# Patient Record
Sex: MALE | Race: WHITE | Employment: FULL TIME | URBAN - METROPOLITAN AREA
[De-identification: names, ages, dates, MRNs, and addresses within clinical notes are randomized per-mention and may not be internally consistent; named-entity substitution may affect disease eponyms.]

---

## 2019-02-06 ENCOUNTER — OFFICE VISIT (OUTPATIENT)
Dept: PULMONOLOGY | Age: 59
End: 2019-02-06

## 2019-02-06 VITALS
HEART RATE: 75 BPM | SYSTOLIC BLOOD PRESSURE: 108 MMHG | BODY MASS INDEX: 27.35 KG/M2 | OXYGEN SATURATION: 94 % | WEIGHT: 201.9 LBS | HEIGHT: 72 IN | TEMPERATURE: 98.2 F | DIASTOLIC BLOOD PRESSURE: 62 MMHG | RESPIRATION RATE: 18 BRPM

## 2019-02-06 DIAGNOSIS — J18.9 MULTIFOCAL PNEUMONIA: Primary | ICD-10-CM

## 2019-02-06 DIAGNOSIS — R53.83 MALAISE AND FATIGUE: ICD-10-CM

## 2019-02-06 DIAGNOSIS — R07.89 ATYPICAL CHEST PAIN: ICD-10-CM

## 2019-02-06 DIAGNOSIS — R05.3 CHRONIC COUGH: ICD-10-CM

## 2019-02-06 DIAGNOSIS — K22.5 ZENKER DIVERTICULA: ICD-10-CM

## 2019-02-06 DIAGNOSIS — J47.0 BRONCHIECTASIS WITH ACUTE LOWER RESPIRATORY INFECTION (HCC): ICD-10-CM

## 2019-02-06 DIAGNOSIS — J69.0 ASPIRATION PNEUMONIA OF BOTH LOWER LOBES DUE TO GASTRIC SECRETIONS (HCC): ICD-10-CM

## 2019-02-06 DIAGNOSIS — R53.81 MALAISE AND FATIGUE: ICD-10-CM

## 2019-02-06 RX ORDER — TESTOSTERONE 20.25 MG/1.25G
81 GEL TOPICAL DAILY
COMMUNITY
Start: 2018-12-05

## 2019-02-06 RX ORDER — DIVALPROEX SODIUM 500 MG/1
500 TABLET, DELAYED RELEASE ORAL 2 TIMES DAILY
COMMUNITY
Start: 2018-11-01 | End: 2022-10-21

## 2019-02-06 RX ORDER — SODIUM CHLORIDE FOR INHALATION 3 %
4 VIAL, NEBULIZER (ML) INHALATION 4 TIMES DAILY
Qty: 1440 ML | Refills: 3 | Status: SHIPPED | OUTPATIENT
Start: 2019-02-06

## 2019-02-06 RX ORDER — SIMVASTATIN 20 MG/1
20 TABLET, FILM COATED ORAL
COMMUNITY

## 2019-02-06 RX ORDER — GUAIFENESIN 600 MG/1
600 TABLET, EXTENDED RELEASE ORAL 2 TIMES DAILY
Qty: 60 TAB | Refills: 6 | Status: SHIPPED | OUTPATIENT
Start: 2019-02-06

## 2019-02-06 RX ORDER — BUPROPION HYDROCHLORIDE 100 MG/1
100 TABLET ORAL DAILY
COMMUNITY

## 2019-02-06 RX ORDER — DIPHENOXYLATE HYDROCHLORIDE AND ATROPINE SULFATE 2.5; .025 MG/1; MG/1
1 TABLET ORAL
COMMUNITY

## 2019-02-06 RX ORDER — ALBUTEROL SULFATE 0.83 MG/ML
2.5 SOLUTION RESPIRATORY (INHALATION) 4 TIMES DAILY
Qty: 360 EACH | Refills: 3 | Status: SHIPPED | OUTPATIENT
Start: 2019-02-06

## 2019-02-06 NOTE — PROGRESS NOTES
[address] [place] [address], [address] Kellen Santos [address] 
[phone] [facility] Pulmonary, Critical Care, and Sleep Medicine Pulmonary Office Initial Consultation Name: Kristin Alejandre 60 y.o. male MRN: [id number] : 1960 Service Date: 19 Referring Provider: Carla Eric MD 
[address] [facility] [address] [place], [address] Chief Complaint:  
Chief Complaint Patient presents with  Abnormal CT Scan  
  referred by Dr. Tyler Dangelo; CT 12/10/2018  Chest Pain History of Present Illness: 
Kristin Alejandre is a 60 y.o. male, who presents to Pulmonary clinic referred for abnormal CT imaging by his PCP. Pt reported he has been having chest pain \"down inside\". This has been going on for at least a year, may be last 1-2 years. Pt reported he was told he had \"calcification\" in his lungs in the past.  He describes the chest pain as discomfort. Associated with malaise and fatigue. Symptoms for the last year of this severity. Pt reports no chest pain with exertion/angina. Pt reports chest pain daily - constant, substernal, sometimes radiating to the back. Patient reports no alleviating factors or other modifying factors. He reports no changes with food consumption. Pt reported symptoms were worsening recently, so he saw his PCP in December and he was sent for CT scan. Pt reports that he underwent a course of ABx (Avelox) after the CT scan. Pt denied any fevers or chills or night sweats. Of note, pt reports he had a CXR a few years ago. He reports it was abnormal - showing he had a \"calcification\". Patient reports he was referred to GI. He saw Dr. Deep Rey with GI, in [place], who sent him for a barium swallow which diagnosed a Zenker's diverticulum. Patient was then referred to ENT by GI. Pt saw Dr. David Gomes with ENT, who noted that the patient was likely aspirating. During that visit, the patient had a laryngoscopy that confirmed the aspiration Of note, he originially saw Dr. Epi Rea for ulcerative colitis - he underwent a total procolectomy with Valenzuela pouch in 2/14/12. He was referred to Dr. Prachi Negron with [facility] - (Thoracic surgery?), with whom he is to see later this month. Patient reports associated coughing, intermittently productive, occurs on a daily basis both day and night. Pt has multiple nocturnal awakenings with coughing, which occur nightly. There are no modifying factors. No alleviating factors. He denies fevers, chills, night sweats, hemoptysis, angina, orthopnea, LE swelling, worsening voice hoarseness. 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 No occupational exposure to coal, silica, or asbestos Past Medical Hx: I have personally reviewed medical hx Past Medical History:  
Diagnosis Date  Depression  Hyperlipidemia  Hypogonadism in male  Ulcerative colitis ([address] Past Surgical Hx: I have personally reviewed surgical hx Past Surgical History:  
Procedure Laterality Date  HX COLECTOMY  02/14/2012 Family Hx: I have personally reviewed the family hx. No family hx of cancer or hereditary lung disease with immediate family. Family History Problem Relation Age of Onset  Heart Disease Mother  COPD Father  Heart Disease Father Social Hx: I have personally reviewed the social hx. Social History Socioeconomic History  Marital status:  Spouse name: Not on file  Number of children: Not on file  Years of education: Not on file  Highest education level: Not on file Social Needs  Financial resource strain: Not on file  Food insecurity - worry: Not on file  Food insecurity - inability: Not on file  Transportation needs - medical: Not on file  Transportation needs - non-medical: Not on file Occupational History  Not on file Tobacco Use  Smoking status: Not on file Substance and Sexual Activity  Alcohol use: Not on file  Drug use: Not on file  Sexual activity: Not on file Other Topics Concern  Not on file Social History Narrative  Not on file Allergies: I have reviewed the allergy hx No Known Allergies Medications:  I have reviewed the patient's medications Prior to Admission medications Medication Sig Start Date End Date Taking? Authorizing Provider  
simvastatin (ZOCOR) 20 mg tablet Take 20 mg by mouth nightly. Yes Provider, Historical  
diphenoxylate-atropine (LOMOTIL) 2.5-0.025 mg per tablet Take  by mouth four (4) times daily as needed for Diarrhea. Yes Provider, Historical  
buPROPion (WELLBUTRIN) 100 mg tablet Take 100 mg by mouth two (2) times a day. Yes Provider, Historical  
divalproex DR (DEPAKOTE) 500 mg tablet Take 500 mg by mouth two (2) times a day. 11/1/18  Yes Provider, Historical  
testosterone (ANDROGEL) 20.25 mg/1.25 gram (1.62 %) gel APPLY 3 PUMPS DAILY TO SHOULDERS 12/5/18  Yes Provider, Historical  
guaiFENesin ER (MUCINEX) 600 mg ER tablet Take 1 Tab by mouth two (2) times a day. 2/6/19  Yes Giulia Pickard MD  
sodium chloride 3 % nebulizer solution 4 mL by Nebulization route four (4) times daily. Use with albuterol nebulizer 2/6/19  Yes Giulia Pickard MD  
albuterol (PROVENTIL VENTOLIN) 2.5 mg /3 mL (0.083 %) nebulizer solution 3 mL by Nebulization route four (4) times daily. Please use with hypertonic saline 3% nebulizer 2/6/19  Yes Giulia Pickard MD  
 
 
Immunizations:  I have reviewed the patient's immunizations There is no immunization history on file for this patient. Review of Systems: A complete review of systems was performed as stated in the HPI, all others are negative.  
 
 
Objective: 
 
Physical Exam: 
/62 (BP 1 Location: Left arm, BP Patient Position: Sitting)   Pulse 75   Temp 98.2 °F (36.8 °C) (Oral)   Resp 18   Ht 6' (1.829 m)   [employer] 91.6 kg (201 lb 14.4 oz)   SpO2 94%   BMI 27.38 kg/m² Vitals were personally reviewed Gen: no acute distress, pleasant and cooperative, sitting up in chair, able to climb to exam table w/o difficulty HEENT: normocephalic/atraumatic, PERRLA, EOMI, no scleral icterus, nasal turbinates have no erythema, no nasal polyps, no oral lesions, good dentition, Mallampati I Neck: supple, trachea midline, no JVD, no cervical and supraclavicular adenopathy Chest: no lesions, with even rise and fall, no pectus excavatum or flail chest 
CVS: regular rate rhythm, S1/S2, no murmurs/rubs/gallops Lungs: good air entry B/L, CTABL, no wheezes/rales/rhonchi Back: no kyphosis or scoliosis Abdomen: soft, nontender, bowel sounds present, no hepatosplenomegaly Ext: no pitting edema B/L, no peripheral cyanosis or clubbing Neuro: grossly normal, AAOx3, normal strength and coordination grossly, no focal deficits Skin: no rashes, erythema, lesions Psych: normal memory, thought content, and processing Labs: I have reviewed the patient's available labs --reviewed from outside reports, obtained from [address] [place] [address] (19/55/4344 9:14 AM) COMPREHENSIVE METABOLIC PANEL (50/71/9605 9:14 AM) Component Value Ref Range Potassium 4.9 3.5 - 5.5 mmol/L  
SODIUM 145 133 - 145 mmol/L  
CHLORIDE 101 98 - 110 mmol/L Glucose 99 70 - 99 mg/dL CALCIUM 9.5 8.4 - 10.4 mg/dL ALBUMIN 4.3 3.5 - 5.0 g/dL SGPT (ALT) 17 5 - 40 U/L  
SGOT (AST) 16 10 - 37 U/L  
BILIRUBIN TOTAL 0.5 0.2 - 1.2 mg/dL ALKALINE PHOSPHATASE 75 25 - 115 U/L  
BUN 14 6 - 22 mg/dL CO2 26 20 - 32 mmol/L  
CREATININE 0.8 0.5 - 1.2 mg/dL  
eGFR African American >60.0 >60.0  
eGFR Non African American >60.0 >60.0 GLOBULIN SERUM 2.6 2.0 - 4.0 g/dL A/G RATIO 1.7 1.1 - 2.6 ratio TOTAL PROTEIN 6.9 6.4 - 8.3 g/dL ANION GAP 18.0 mmol/L  
 
COMPREHENSIVE METABOLIC PANEL (43/02/8266 9:14 AM) Specimen Performing Laboratory Blood [facility] REFERENCE [facility]   
[address] [facility], [address] [address]    
 
COMPREHENSIVE METABOLIC PANEL (04/68/4552 9:14 AM) Narrative Estimated GFR results are reported in mL/min/1.73 sq.m. by the MDRD equation.   
   
This eGFR is validated for stable chronic renal failure patients. This equation is unreliable in acute illness or patients with normal renal function.   
   
    
Back to top of Lab Results LIPID COMPLETE PANEL (06/11/2018 9:14 AM) LIPID COMPLETE PANEL (06/11/2018 9:14 AM) Component Value Ref Range Cholesterol 152 110 - 200 mg/dL Triglyceride 148 40 - 149 mg/dL HDL 52 40 - 59 mg/dL Cholesterol/HDL 2.9 0.0 - 5.0 LDL CALCULATION 71 50 - 99 mg/dL VLDL CALCULATION 30 8 - 30 mg/dL LIPID COMPLETE PANEL (06/11/2018 9:14 AM) Specimen Performing Laboratory Blood [facility] REFERENCE LAB   
[address], [address], [address]   
 
Back to top of Lab Results CBC (06/11/2018 9:14 AM) CBC (06/11/2018 9:14 AM) Component Value Ref Range WBC x 10*3 7.5 4.0 - 11.0 K/uL  
RBC x 10^6 4.52 3.80 - 5.80 M/uL  
HGB 14.8 13.1 - 17.2 g/dL HCT 44.6 39.3 - 51.6 % MCV 99 (H) 80 - 95 fL  
MCH 33 26 - 34 pg MCHC 33 31 - 36 g/dL  
RDW 12.6 10.0 - 15.5 % Platelet 134 798 - 867 K/uL MPV 10.3 9.0 - 13.0 fL  
 
CBC (06/11/2018 9:14 AM) Specimen Performing Laboratory Blood [facility] REFERENCE [facility]   
[address] [facility], [address], [address]   
 
 
Outside records reviewed personally as follows (scanned into [facility]): 
--Note from his primary care-Dr. Rachelle Ro from 12/14/18 shows the patient was there for follow-up due to chest pain. Patient had a CT scan done which showed multiple infiltrates. Per the report patient was prescribed Avelox to cover for lung infection, but patient continued to have persistent mid chest discomfort.   Patient did report some difficulty swallowing at times and noted some solid food got stuck in his upper chest, with some regurgitation of food. Patient was referred to pulmonary due to this abnormal CT scan. --Barium swallow from 1/17/19 report reviewed: 
Result Impression Zenker diverticulum, as described above. Suggestive for a small epiphrenic diverticulum Small hiatal hernia with mild gastroesophageal reflux. Correlate clinically. Consider correlation with endoscopy Signed By: Jeanie Arguelles MD on 1/17/2019 1:15 PM  
Result Narrative Indication: Dysphagia Comparison: None Total fluoroscopy time was 3 minutes. Total fluoroscopic images was 87 images. A preliminary chest x-ray demonstrates that heart size is within normal limits.  No acute infiltrates or pleural effusions are identified.  The mediastinal contours appear normal. 
 
Multiple fluoroscopic spot images were obtained during the oral ingestion of thin and thick barium suspensions following the ingestion of effervescent granules. Findings: The study demonstrates a large(approximately 3 cm in size) partially contrast filled outpouching arising from the midline the posterior wall of the distal pharynx near is a Pharyngeal esophageal junction, best identified during the swallowing phase and on the lateral projection. Most consistent with a Zenker's diverticulum. In addition, there is a smaller persistent contrast filled outpouching at the distal esophagus suggestive for epiphrenic diverticulum There is a small hiatal hernia with mildly gastroesophageal reflux identified during the examination. There is a secondary contraction of the esophageal peristalsis noted Imaging:  I have personally reviewed patient's imaging brought by disc by patient as follows and annotated below - CT chest with contrast from 12/10/18 shows scattered bilateral infiltrates in the right middle lobe, right lower lobe and left lower lobe.   Bilateral lower lobe bronchiectasis also seen. Of note esophagus is also dilated in 2 places, one near the epiglottis and the other in the mid esophageal region. No masses or adenopathy or effusions seen. Official report per radiology: 
Result Impression 1. Multifocal bilateral alveolar opacities, raising concern for multifocal pneumonia. 2. Partial visualized upper abdominal structures with dilated small bowel loops measuring up to 3.7 cm which raises concern for small bowel obstruction in this patient with a history of prior colectomy and ulcerative colitis, less likely ileus. 
 
-Above findings were discussed with Dr. Myra Bright at 87 [phone] 98 on 12/11/18, with confirmatory results. Signed By: Annamae Schilder, MD on 12/11/2018 1:31 PM  
Result Narrative EXAM: CT CHEST W/ CONTRAST CLINICAL INDICATION/HISTORY: R07.89: Chest wall pain R93.89: Abnormal chest CT. 
  > Additional: None COMPARISON: CT chest from 03/25/2017 TECHNIQUE: Helical CT imaging from the thoracic inlet through the diaphragm with intravenous contrast. Multiplanar reformats were generated. One or more dose reduction techniques were used on this CT: automated exposure control, adjustment of the mAs and/or kVp according to patient size, and iterative reconstruction techniques. The specific techniques used on this CT exam have been documented in the patient's electronic medical record. 
 
_______________ FINDINGS: 
 
LUNGS: Patchy peribronchial groundglass opacity in the left lower lobe, paramedial basilar, anterior lateral and basal segments. Medial segment right middle lobe patchy parenchymal opacity with patchy confluent areas of alveolar groundglass extending to the hilum. Additional areas of smaller confluent multifocal groundglass opacities in the infrahilar right lower lobe and both upper lobes without discrete consolidation. Redemonstration of calcified granuloma right upper lobe. PLEURA: No effusion or pneumothorax. AIRWAY: No filling defect, mild bilateral perihilar and left lower lobe bronchial wall thickening MEDIASTINUM: Normal heart size. No pericardial effusion. Great vessels unremarkable. LYMPH NODES: Subcentimeter scattered mediastinal lymph nodes with 1 cm enlarged right hilar lymph node. UPPER ABDOMEN: Cluster of dilated small bowel loops in the upper abdomen measuring up to 3.7 cm with fecalized material. Multiple low attenuating hepatic cysts, the largest on the left measuring 1.9 cm. Left upper pole 1.2 cm cyst is stable. Annamary Ripa OSSEOUS: No acute or aggressive abnormalities identified.    
 
OTHER: None. PFTs:  I have reviewed the patient's PFTs-none on file TTE:  I have reviewed the patient's TTE results No results found for this or any previous visit. Assessment and Plan: 
60 y.o. male with: 
 
Impression: 1. Multiple pulmonary infiltrates likely consistent with multifocal pneumonia secondary to suspected aspiration pneumonia: Seen on CT scan from 12/10/18, patient underwent a course of Avelox. Patient currently denies any infectious symptoms. 2.  Recurrent aspiration in the setting of his Zenker diverticulum: Patient reports symptoms for the last 2 years, abnormal esophagus seen on CT chest from 12/10/18. Patient referred to GI who ordered a barium swallow confirming diagnosis of Zenker's diverticulum. Patient then referred to ENT, confirming aspiration on laryngoscopy and patient referred to thoracic surgery. 3.  Bronchiectasis, non-CF: Seen in bilateral lower lobes, etiology most likely due to recurrent aspiration. Less likely this is due to his underlying IBD. 4.  Chronic cough: Etiology due to above 5. Malaise and fatigue 6. Atypical chest pain: Etiology due to above Plan: 
-Spent time going over CT scan with both patient and his wife. I reviewed the potential etiologies of his infiltrates as well as went over the etiologies of his bronchiectasis. -Repeat CT chest without contrast in 3 months 
-Agree with referral to thoracic surgery for correction of Zenker's diverticulum. I did advise the patient that we will continue to follow symptoms as he may also have a motility issue based off his esophageal dilation seen on CT scan near the mid esophagus. -Advised aggressive airway clearance 4 times daily due to bronchiectasis with the following: 
 Flutter device Adeel Duane), sample given in clinic 3% hypertonic saline nebs with albuterol, AeroEclipse nebulizer sample given in clinic Mucinex 600 mg twice daily 
-DME order placed for nebulizer with supplies given ancillary staff 
-Full PFTs at next visit 
-Counseled the patient regarding aspiration precautions including elevating head of bed greater than 30 degrees, avoiding eating or drinking 3 hours before laying recumbent, etc. 
-Advised patient that if he develops symptoms of respiratory infection including fevers or increased purulent sputum production, to contact our office as he may require broad-spectrum antibiotics to cover a polymicrobial infection including anaerobes. Patient and wife expressed understanding RTC: Follow-up Disposition: 
Return in about 10 weeks (around 4/17/2019). Orders Placed This Encounter  AMB POC PFT COMPLETE W/BRONCHODILATOR  AMB POC PFT COMPLETE W/O BRONCHODILATOR  
 GAS DILUT/WASHOUT LUNG VOL W/WO DISTRIB VENT&VOL  DIFFUSING CAPACITY  AMB SUPPLY ORDER  
 CT CHEST WO CONT  guaiFENesin ER (MUCINEX) 600 mg ER tablet  sodium chloride 3 % nebulizer solution  albuterol (PROVENTIL VENTOLIN) 2.5 mg /3 mL (0.083 %) nebulizer solution Fam Roberts MD/MPH Pulmonary, Critical Care Medicine [facility]

## 2019-02-06 NOTE — PROGRESS NOTES
Chief Complaint Patient presents with  Abnormal CT Scan  
  referred by Dr. Norberta Oppenheim; CT 12/10/2018 1. Have you been to the ER, urgent care clinic since your last visit? Hospitalized since your last visit? No 
 
2. Have you seen or consulted any other health care providers outside of the [address] [place] [address] since your last visit? Include any pap smears or colon screening. Yes Where: Dr. Kaleigh Abdalla, PCP, Dr. Eugenia Yi, ENT, Dr. Madiha Calderon, [facility] surgeon

## 2019-02-06 NOTE — LETTER
2019 6:06 PM 
 
Patient:  Jolene Suarez YOB: 1960 Date of Visit: 2019 Dear Wilford Apgar, MD [place] [address] [address] [address] [place] [address] VIA Facsimile: [phone] Jacki Rosales MD 
[facility] [address] VIA Facsimile: [phone] Radha Sanford MD 
[address]  
[address] [place] [address] VIA Facsimile: [phone] Melanie Jackson, DO 
[address] [facility] [address] [place] [address] VIA Facsimile: [phone] 
 : 
 
Thank you for referring Mr. Devin Leone to me for evaluation/treatment. Below are the relevant portions of my assessment and plan of care. If you have questions, please do not hesitate to call me. I look forward to following Mr. Tasha Hernandez along with you. Sincerely, Xu Longo MD/MPH Pulmonary, Critical Care Medicine [facility]

## 2019-02-11 ENCOUNTER — TELEPHONE (OUTPATIENT)
Dept: PULMONOLOGY | Age: 59
End: 2019-02-11

## 2019-02-11 NOTE — TELEPHONE ENCOUNTER
Pt wife stated she has received all Dr. Drea Duke prescribed for pt except for the Nebulizer. Wanting to know when he should be expecting to get it because she thought it all came together. Please advise [phone].

## 2019-02-11 NOTE — TELEPHONE ENCOUNTER
Med was received from pharmacy but pt and wife didn't know where the Neb machine would be coming from. [facility] was where the order was faxed this am. They will be getting a call from [place] [employer] [place].  Wife states she understands

## 2019-02-15 ENCOUNTER — TELEPHONE (OUTPATIENT)
Dept: PULMONOLOGY | Age: 59
End: 2019-02-15

## 2019-02-15 NOTE — TELEPHONE ENCOUNTER
PT'S WIFE [phone]). DR MCALLISTER WANTED PATIENT TO HAVE A NEBULIZER AND HE HAS NOT HEARD FROM ANYONE. HE WANTS TO KNOW IF HE CAN JUST BUY ONE. PLEASE CHECK AND CALL BACK.

## 2019-02-15 NOTE — TELEPHONE ENCOUNTER
Spoke with Willian Hernandez with [facility]. They have now gotten prior approval for the nebulizer for the pt. They will be contacting re delivery.  Wife informed

## 2019-04-12 ENCOUNTER — HOSPITAL ENCOUNTER (OUTPATIENT)
Dept: CT IMAGING | Age: 59
Discharge: HOME | End: 2019-04-12
Attending: INTERNAL MEDICINE
Payer: COMMERCIAL

## 2019-04-12 DIAGNOSIS — R53.81 MALAISE AND FATIGUE: ICD-10-CM

## 2019-04-12 DIAGNOSIS — J47.0 BRONCHIECTASIS WITH ACUTE LOWER RESPIRATORY INFECTION (HCC): ICD-10-CM

## 2019-04-12 DIAGNOSIS — R07.89 ATYPICAL CHEST PAIN: ICD-10-CM

## 2019-04-12 DIAGNOSIS — J18.9 MULTIFOCAL PNEUMONIA: ICD-10-CM

## 2019-04-12 DIAGNOSIS — J69.0 ASPIRATION PNEUMONIA OF BOTH LOWER LOBES DUE TO GASTRIC SECRETIONS (HCC): ICD-10-CM

## 2019-04-12 DIAGNOSIS — R53.83 MALAISE AND FATIGUE: ICD-10-CM

## 2019-04-12 DIAGNOSIS — K22.5 ZENKER DIVERTICULA: ICD-10-CM

## 2019-04-12 PROCEDURE — 71250 CT THORAX DX C-: CPT

## 2019-04-16 ENCOUNTER — TELEPHONE (OUTPATIENT)
Dept: PULMONOLOGY | Age: 59
End: 2019-04-16

## 2019-04-16 NOTE — TELEPHONE ENCOUNTER
Pt stated he would like the results of his CT from 4/12 @ [facility]. Please advise [phone] or [phone] (work #).

## 2019-04-17 NOTE — TELEPHONE ENCOUNTER
4/17/19: Message forwarded to Dr. Gloria Evans for review. MD Shara Rodgers LPN   Caller: Unspecified (2 days ago,  4:28 PM)             Let him know that his CT looks good.  The areas of inflammation/pneumonia almost completely resolved. [facility] has one very very small area of inflammation left.  He should followup with me as scheduled. Gloria Evans MD/MPH   Pulmonary, [address] [place] [address] Pulmonary Specialists     4/18/19: Called patient, no answer, left message requesting return call. Patient returned call, notified him of results per Dr. Deal Look request. Patient verbalized understanding.

## 2019-06-03 DIAGNOSIS — R05.3 CHRONIC COUGH: Primary | ICD-10-CM

## 2019-06-03 NOTE — PROGRESS NOTES
Order placed for Full PFT, per Verbal Order from Dr. Anupama Houston on 6/3/2019 due to test needing to be done at hospital due to office PFT machine needing repair. Last office visit:   2/06/19  Follow up Visit:      Provider is aware of last office visit and follow up.   No further action requested from provider

## 2019-06-05 ENCOUNTER — OFFICE VISIT (OUTPATIENT)
Dept: PULMONOLOGY | Age: 59
End: 2019-06-05

## 2019-06-05 VITALS
TEMPERATURE: 98.6 F | HEIGHT: 72 IN | BODY MASS INDEX: 28.24 KG/M2 | HEART RATE: 74 BPM | OXYGEN SATURATION: 95 % | DIASTOLIC BLOOD PRESSURE: 60 MMHG | SYSTOLIC BLOOD PRESSURE: 90 MMHG | RESPIRATION RATE: 20 BRPM | WEIGHT: 208.5 LBS

## 2019-06-05 DIAGNOSIS — R05.3 CHRONIC COUGH: ICD-10-CM

## 2019-06-05 DIAGNOSIS — K22.5 ZENKER DIVERTICULUM: ICD-10-CM

## 2019-06-05 DIAGNOSIS — G47.33 OSA (OBSTRUCTIVE SLEEP APNEA): Primary | ICD-10-CM

## 2019-06-05 NOTE — PROGRESS NOTES
[address] [facility] [address] [place] [address] [facility]  Pulmonary, Critical Care, and Sleep Medicine    Pulmonary Office Followup  Name: Nicole Arceo 60 y.o. male  MRN: [id number]  : 1960  Service Date: 19  Chief Complaint:   Chief Complaint   Patient presents with    Breathing Problem     F/U to  CT        History of Present Illness:  Nicole Arceo is a 60 y.o. male, who presents to Pulmonary clinic for follow-up of his abnormal CT scan. Patient was last seen in clinic on 2019. In the interval, patient had to correct his Zenker's diverticulum surgery function on  -- doing well since that time. Sx have improved significantly -- food only getting caught occasionally in the back of his throat. Occurs maybe once every few weeks. Pt reports cough at night as resolved. No exacerbations or LRTIs in the interval.  Pt reports his wife reported significant snoring -- wife has to wake him up in order to stop it. Pt had a diagnostic PSG about 4 or 5 years ago-- pt reports he had mild JUAN with AHI of 5.1. He was started on CPAP at that time, but reports that he stopped using it after a while. He reports now it affects his marriage, his wife has to wake up and go to another room. He denies fevers, chills, night sweats, hemoptysis, angina, orthopnea, LE swelling, worsening voice hoarseness.       Past Medical History:   Diagnosis Date    Depression     Hyperlipidemia     Hypogonadism in male    [facility] Ulcerative colitis ([facility] [address]      Past Surgical History:   Procedure Laterality Date    HX COLECTOMY  2012     Family History   Problem Relation Age of Onset    Heart Disease Mother     COPD Father     Heart Disease Father      Social History     Socioeconomic History    Marital status:      Spouse name: Not on file    Number of children: Not on file    Years of education: Not on file    Highest education level: Not on file Occupational History    Not on file   Social Needs    Financial resource strain: Not on file    Food insecurity:     Worry: Not on file     Inability: Not on file    Transportation needs:     Medical: Not on file     Non-medical: Not on file   Tobacco Use    Smoking status: Never Smoker    Smokeless tobacco: Never Used   Substance and Sexual Activity    Alcohol use: No     Frequency: Never    Drug use: No    Sexual activity: Not on file   Lifestyle    Physical activity:     Days per week: Not on file     Minutes per session: Not on file    Stress: Not on file   Relationships    Social connections:     Talks on phone: Not on file     Gets together: Not on file     Attends Scientologist service: Not on file     Active member of club or organization: Not on file     Attends meetings of clubs or organizations: Not on file     Relationship status: Not on file    Intimate partner violence:     Fear of current or ex partner: Not on file     Emotionally abused: Not on file     Physically abused: Not on file     Forced sexual activity: Not on file   Other Topics Concern    Not on file   Social History Narrative    Not on file       Allergies: I have reviewed the allergy hx  No Known Allergies    Medications:  I have reviewed the patient's medications  Prior to Admission medications    Medication Sig Start Date End Date Taking? Authorizing Provider   simvastatin (ZOCOR) 20 mg tablet Take 20 mg by mouth nightly. Yes Provider, Historical   diphenoxylate-atropine (LOMOTIL) 2.5-0.025 mg per tablet Take  by mouth four (4) times daily as needed for Diarrhea. Yes Provider, Historical   buPROPion (WELLBUTRIN) 100 mg tablet Take 100 mg by mouth two (2) times a day. Yes Provider, Historical   testosterone (ANDROGEL) 20.25 mg/1.25 gram (1.62 %) gel APPLY 3 PUMPS DAILY TO SHOULDERS 12/5/18  Yes Provider, Historical   sodium chloride 3 % nebulizer solution 4 mL by Nebulization route four (4) times daily.  Use with albuterol nebulizer 2/6/19  Yes Unique Osuna MD   albuterol (PROVENTIL VENTOLIN) 2.5 mg /3 mL (0.083 %) nebulizer solution 3 mL by Nebulization route four (4) times daily. Please use with hypertonic saline 3% nebulizer 2/6/19  Yes Unique Osuna MD   divalproex DR (DEPAKOTE) 500 mg tablet Take 500 mg by mouth two (2) times a day. 11/1/18   Provider, Historical   guaiFENesin ER (MUCINEX) 600 mg ER tablet Take 1 Tab by mouth two (2) times a day. 2/6/19   Unique Osuna MD       Immunizations:  I have reviewed the patient's immunizations    There is no immunization history on file for this patient. Review of Systems:  A complete review of systems was performed as stated in the HPI, all others are negative. Objective:    Physical Exam:  BP 90/60 (BP 1 Location: Left arm, BP Patient Position: Sitting)   Pulse 74   Temp 98.6 °F (37 °C)   Resp 20   Ht 6' (1.829 m)   Wt 94.6 kg (208 lb 8 oz)   SpO2 95%   BMI 28.28 kg/m²   Vitals were personally reviewed  Gen: no acute distress, pleasant and cooperative, sitting up in chair, able to climb to exam table w/o difficulty  HEENT: normocephalic/atraumatic, PERRLA, EOMI, no scleral icterus, no oral lesions, good dentition, Mallampati I  Neck: supple, trachea midline, no JVD  CVS: regular rate rhythm, S1/S2, no murmurs/rubs/gallops  Lungs: good air entry B/L, CTABL, no wheezes/rales/rhonchi  Back: no kyphosis or scoliosis  Abdomen: soft, nontender, bowel sounds present, no hepatosplenomegaly  Ext: no pitting edema B/L, no peripheral cyanosis or clubbing  Neuro: grossly normal, AAOx3, normal strength and coordination grossly, no focal deficits  Psych: normal memory, thought content, and processing    Labs: I have reviewed the patient's available labs --  No new labs. Imaging:  I have personally reviewed patient's imaging as follows -- CT chest w/o contrast from 4/12/19 shows significant improvement from B/L opacities from CT chest in 2/2019.   There is some mild residual GGO in the RML, otherwise clear. No masses, effusions, nodules, consolidations. Official report per radiology:  CT Results (most recent):  Results from Hospital Encounter encounter on 04/12/19   CT CHEST WO CONT    Narrative    EXAM: CT CHEST WO CONT    CLINICAL INDICATION/HISTORY : F/U of pulm infiltrates and bronchiectasis - look  for resolution. COMPARISON: None    TECHNIQUE: Helical scans through the chest was obtained from the thoracic inlet  to the diaphragm without IV contrast administration. All CT scans at this facility are performed using dose optimization technique as  appropriate to a performed exam to include automated exposure control,  adjustment of the mA and/or kV according to patient size (including appropriate  matching for sight specific examinations)  or use of iterative reconstruction  technique. FINDINGS:     Lungs: Scattered calcified granulomas. Very subtle ground glass opacities in the  right upper lobe -coronal 42., Axial 25 and 26 and 32    Thyroid and chest wall: Unremarkable    Heart and great vessels: Unremarkable    Lymph nodes: No adenopathy    Pleura: No effusions    Upper Abdomen: 2 subcentimeter hypodensities in the left hepatic lobe are too  small to fully characterize but likely represent cysts. Small cyst upper pole  left kidney. Surgical clips upper left abdomen    Bones: Unremarkable      Impression IMPRESSION:    1. Very subtle small vague groundglass opacities in the right upper lobe likely  infectious or inflammatory. Possibly residua from prior pneumonia (comparison  studies not available)  2. Old granulomatous disease. Images from CT from 12/2018          PFTs: None on file    TTE:  I have reviewed the patient's TTE results  No results found for this or any previous visit. Assessment and Plan:  60 y.o. male with:    Impression:  1.   Multiple pulmonary infiltrates likely consistent with multifocal pneumonia secondary to suspected aspiration pneumonia --resolved: Seen on CT scan from 12/10/18, patient underwent a course of Avelox. Patient underwent surgical correction for Zenker's diverticulum. Symptoms have reduced significantly. Repeat CT scan in April shows almost complete resolution of groundglass opacities. 2.  Recurrent aspiration in the setting of his Zenker diverticulum: Patient reports symptoms for the last 2 years, abnormal esophagus seen on CT chest from 12/10/18. Patient referred to GI who ordered a barium swallow confirming diagnosis of Zenker's diverticulum. Patient then referred to ENT, confirming aspiration on laryngoscopy and patient referred to thoracic surgery. Patient underwent surgical correction in April  3. Chronic cough: Etiology due to above. Resolved  4. Malaise and fatigue: Resolved  5. Atypical chest pain: Resolved  6. Suspect JUAN: Patient was diagnosed about 4 to 5 years ago. Patient having worsening snoring, with witnessed apneas. And hence stop bang score of 5    Plan:  -Counseled patient regarding aspiration lifestyle precautions, including elevating head of bed with a wedge pillow and avoiding eating or drinking 3 hours before sleep.  -Management of Zenker's diverticulum per ENT and thoracic surgery  -Counseled patient that he may consider reducing airway clearance down to 2 times a day. He may also consider stopping therapy at this time. Flutter device Sadi Velasquez) + 3% hypertonic saline nebs with albuterol  -Full PFTs at next visit  -Ordered split-night polysomnography. Counseled patient again sleepy driving. With regards to snoring, advised patient to sleep on an incline and avoid sleeping on his back, given lifestyle modifications that will help with this. Also counseled the patient regarding potential CPAP therapy use, patient agreeable to therapy if indicated. -Advised to remain active. Follow-up and Dispositions    · Return in about 6 months (around 12/5/2019).        Orders Placed This Encounter    SPLIT CPAP/PSG       Christie Miranda MD/MPH     Pulmonary, Critical Care Medicine  [facility]

## 2019-06-05 NOTE — PROGRESS NOTES
The pt. states he has noted improvement in symptoms which he feels is due to the nebulizer use. Cecily Cardenas presents today for   Chief Complaint   Patient presents with    Breathing Problem     F/U to 2/6 CT 4/12       Is someone accompanying this pt? No    Is the patient using any DME equipment during OV? Nebulizer   -DME Company Unknown    Depression Screening:  3 most recent PHQ Screens 2/6/2019   Little interest or pleasure in doing things More than half the days   Feeling down, depressed, irritable, or hopeless More than half the days   Total Score PHQ 2 4       Learning Assessment:  Completed    Abuse Screening:  No      Fall Risk  None        Coordination of Care:  1. Have you been to the ER, urgent care clinic since your last visit? Hospitalized since your last visit? 2. Have you seen or consulted any other health care providers outside of the [address] [place] [address] since your last visit? Medication variance in dosage/sig per patient as follows: Per Med. Rec.

## 2019-06-27 ENCOUNTER — HOSPITAL ENCOUNTER (OUTPATIENT)
Dept: RESPIRATORY THERAPY | Age: 59
Discharge: HOME | End: 2019-06-27
Attending: INTERNAL MEDICINE
Payer: COMMERCIAL

## 2019-06-27 DIAGNOSIS — R05.3 CHRONIC COUGH: ICD-10-CM

## 2019-06-27 PROCEDURE — 94726 PLETHYSMOGRAPHY LUNG VOLUMES: CPT

## 2019-06-27 PROCEDURE — 94060 EVALUATION OF WHEEZING: CPT

## 2019-06-27 PROCEDURE — 94729 DIFFUSING CAPACITY: CPT

## 2019-07-25 ENCOUNTER — HOSPITAL ENCOUNTER (OUTPATIENT)
Dept: SLEEP MEDICINE | Age: 59
Discharge: HOME | End: 2019-07-25
Payer: COMMERCIAL

## 2019-07-25 DIAGNOSIS — G47.33 OSA (OBSTRUCTIVE SLEEP APNEA): ICD-10-CM

## 2019-07-25 PROCEDURE — 95811 POLYSOM 6/>YRS CPAP 4/> PARM: CPT

## 2019-07-25 PROCEDURE — 95810 POLYSOM 6/> YRS 4/> PARAM: CPT

## 2019-07-26 VITALS
SYSTOLIC BLOOD PRESSURE: 120 MMHG | DIASTOLIC BLOOD PRESSURE: 73 MMHG | BODY MASS INDEX: 29.28 KG/M2 | HEIGHT: 72 IN | WEIGHT: 216.2 LBS | HEART RATE: 70 BPM

## 2019-07-26 NOTE — PROGRESS NOTES
[address] Patient Status:  Inpatient    1951 MRN [id number]   Location [facility] 5SW/SE Attending Sisi Macedo MD   Hosp Day # 3 PCP Casimiro Gayle MD     Ezequiel Morton is a 75year old femal · Patient arrived for sleep study  · Physician Orders were reviewed. · Patient questions were answered. · Patient education to include initiation of PAP therapy per protocol. · Patient demonstrated good understanding of the positive airway pressure device. insufficiency, stage 4 (severe) (HCC)    Severe protein-calorie malnutrition (HCC)      Blood pressure 122/65, pulse 71, temperature 97.8 °F (36.6 °C), temperature source Oral, resp. rate 16, height 62\", weight 114 lb 4.8 oz (51.8 kg), SpO2 94 %.     Patricia Greek

## 2019-08-02 DIAGNOSIS — G47.33 OSA (OBSTRUCTIVE SLEEP APNEA): Primary | ICD-10-CM

## 2019-08-02 NOTE — PROGRESS NOTES
The patient underwent sleep testing on 7/25/19. Recommendations listed below. Please refer to full report for specific details. Overall, the patient appeared to tolerate study well and reported sleeping better than normal.  Moderate to severe JUAN was noted during the diagnostic portion of the study. The patient was subsequently started on a CPAP titration. JUAN physiology appeared well optimized at a final CPA setting of 8 cwp. REM supine sleep was briefly achieved at this final CPAP setting. PRESSURES USED DURING THE STUDY: 5, 6, 7, 8 cwp. DIAGNOSIS:  1) Obstructive Sleep Apnea (JUAN): AHI: 24    RECOMMENDATIONS:      Recommend starting patient on CPAP 8 cwp,   Other non-invasive treatment options are recommended were applicable and include the following: weight reduction, smoking cessation, body position training, and modification of alcohol ingestion and/or sedating agents.  If these treatments are not possible or effective, an oral appliance may be an alternative non-invasive treatment.  If non-invasive treatments are not possible or effective, consideration should be given to possible corrective surgical options.  Healthy sleep habits are encouraged.  Individuals are encouraged to obtain 7-9 hours of sleep per day.  Drowsy and/or inattentive driving should be avoided.  This report was generated by personal review of the raw data.  Follow up with referring provider as directed.       El Lr DO, [facility]    [facility]  Pulmonary, Critical Care, and Sleep Medicine

## 2019-08-06 ENCOUNTER — TELEPHONE (OUTPATIENT)
Dept: PULMONOLOGY | Age: 59
End: 2019-08-06

## 2019-08-14 ENCOUNTER — TELEPHONE (OUTPATIENT)
Dept: PULMONOLOGY | Age: 59
End: 2019-08-14

## 2019-10-09 ENCOUNTER — OFFICE VISIT (OUTPATIENT)
Dept: PULMONOLOGY | Age: 59
End: 2019-10-09

## 2019-10-09 VITALS
DIASTOLIC BLOOD PRESSURE: 68 MMHG | HEART RATE: 81 BPM | BODY MASS INDEX: 28.96 KG/M2 | SYSTOLIC BLOOD PRESSURE: 100 MMHG | OXYGEN SATURATION: 96 % | TEMPERATURE: 98.2 F | WEIGHT: 213.8 LBS | HEIGHT: 72 IN | RESPIRATION RATE: 20 BRPM

## 2019-10-09 DIAGNOSIS — G47.33 OSA ON CPAP: Primary | ICD-10-CM

## 2019-10-09 DIAGNOSIS — R11.10 REGURGITATION OF FOOD: ICD-10-CM

## 2019-10-09 DIAGNOSIS — Z99.89 OSA ON CPAP: Primary | ICD-10-CM

## 2019-10-09 NOTE — PROGRESS NOTES
[address] [facility], [facility]. [address] [place] [address] [facility]  Pulmonary, Critical Care, and Sleep Medicine    Pulmonary Office Followup  Name: Herbert Lr 60 y.o. male  MRN: [id number]  : 1960  Service Date: 10/09/19  Chief Complaint:   Chief Complaint   Patient presents with    Breathing Problem     F/U to  PFT , Sleep Study        History of Present Illness:  Herbert Lr is a 60 y.o. male, who presents to Pulmonary clinic for follow-up of JUAN. Patient was last seen in clinic on 2019. In the interval, patient had split-night polysomnography, showed an AHI of 24, RDI of 32, and recommended CPAP setting of 8 cm H2O. Patient reports that he received his unit a few weeks later. Pt reports fully compliant with CPAP. He reports using nasal interface. He reports that he feels more rested after use. Pt reports he is not snoring anymore. Pt reports he feels more refreshed but notes he's \"not a morning person. Patient does report that although machine reads that he did not use it, the modem fell out and he did not find out until a week later. Caffiene: 2/day  No alcohol use  In the interval, with regards to his Zenker's diverticulum, patient reports he has been doing well. Pt reports he stopped doing airway clearance. Patient reports that he has had only a couple episodes of coughing up some regurgitated food. Patient denies any symptoms of pneumonia, no fevers, no chills, no night sweats, no sputum, no rigors, no hemoptysis. Denies angina, orthopnea, LE swelling, parasomnias, hypnagogic/hypnopompic hallucinations.       Past Medical History:   Diagnosis Date    Depression     Hyperlipidemia     Hypogonadism in male    [place] Ulcerative colitis ([facility] [address]      Past Surgical History:   Procedure Laterality Date    HX COLECTOMY  2012     Family History   Problem Relation Age of Onset    Heart Disease Mother     COPD Father     Heart Disease Father      Social History     Socioeconomic History    Marital status:      Spouse name: Not on file    Number of children: Not on file    Years of education: Not on file    Highest education level: Not on file   Occupational History    Not on file   Social Needs    Financial resource strain: Not on file    Food insecurity:     Worry: Not on file     Inability: Not on file    Transportation needs:     Medical: Not on file     Non-medical: Not on file   Tobacco Use    Smoking status: Never Smoker    Smokeless tobacco: Never Used   Substance and Sexual Activity    Alcohol use: No     Frequency: Never    Drug use: No    Sexual activity: Not on file   Lifestyle    Physical activity:     Days per week: Not on file     Minutes per session: Not on file    Stress: Not on file   Relationships    Social connections:     Talks on phone: Not on file     Gets together: Not on file     Attends Gnosticism service: Not on file     Active member of club or organization: Not on file     Attends meetings of clubs or organizations: Not on file     Relationship status: Not on file    Intimate partner violence:     Fear of current or ex partner: Not on file     Emotionally abused: Not on file     Physically abused: Not on file     Forced sexual activity: Not on file   Other Topics Concern    Not on file   Social History Narrative    Not on file       Allergies: I have reviewed the allergy hx  No Known Allergies    Medications:  I have reviewed the patient's medications  Prior to Admission medications    Medication Sig Start Date End Date Taking? Authorizing Provider   cpap machine kit by Does Not Apply route nightly. M.E.D. Yes Provider, Historical   simvastatin (ZOCOR) 20 mg tablet Take 20 mg by mouth nightly. Yes Provider, Historical   diphenoxylate-atropine (LOMOTIL) 2.5-0.025 mg per tablet Take  by mouth four (4) times daily as needed for Diarrhea.    Yes Provider, Historical   buPROPion (WELLBUTRIN) 100 mg tablet Take 100 mg by mouth two (2) times a day. Yes Provider, Historical   testosterone (ANDROGEL) 20.25 mg/1.25 gram (1.62 %) gel APPLY 3 PUMPS DAILY TO SHOULDERS 12/5/18  Yes Provider, Historical   albuterol (PROVENTIL VENTOLIN) 2.5 mg /3 mL (0.083 %) nebulizer solution 3 mL by Nebulization route four (4) times daily. Please use with hypertonic saline 3% nebulizer 2/6/19  Yes Valeriano Felipe MD   divalproex DR (DEPAKOTE) 500 mg tablet Take 500 mg by mouth two (2) times a day. 11/1/18   Provider, Historical   guaiFENesin ER (MUCINEX) 600 mg ER tablet Take 1 Tab by mouth two (2) times a day. 2/6/19   Valeriano Felipe MD   sodium chloride 3 % nebulizer solution 4 mL by Nebulization route four (4) times daily. Use with albuterol nebulizer 2/6/19   Valeriano Felipe MD       Immunizations:  I have reviewed the patient's immunizations    There is no immunization history on file for this patient. Review of Systems:  A complete review of systems was performed as stated in the HPI, all others are negative.     Objective:    Physical Exam:  /68 (BP 1 Location: Left arm, BP Patient Position: Sitting)   Pulse 81   Temp 98.2 °F (36.8 °C)   Resp 20   Ht 6' (1.829 m)   Wt 97 kg (213 lb 12.8 oz)   SpO2 96%   BMI 29.00 kg/m²   Vitals were personally reviewed  Gen: no acute distress, pleasant and cooperative, sitting up in chair, able to climb to exam table w/o difficulty  HEENT: normocephalic/atraumatic, PERRLA, EOMI, no scleral icterus, no oral lesions, good dentition, Mallampati I  Neck: supple, trachea midline, no JVD  CVS: regular rate rhythm, S1/S2, no murmurs/rubs/gallops  Lungs: good air entry B/L, CTABL, no wheezes/rales/rhonchi  Back: no kyphosis or scoliosis  Abdomen: soft, nontender, bowel sounds present, no hepatosplenomegaly  Ext: no pitting edema B/L, no peripheral cyanosis or clubbing  Neuro: grossly normal, AAOx3, normal strength and coordination grossly, no focal deficits  Psych: normal memory, thought content, and processing    Labs: I have reviewed the patient's available labs --  No new labs. Imaging:  I have personally reviewed patient's imaging as follows --no new studies in the interval   CT Results (most recent):  Results from Hospital Encounter encounter on 04/12/19   CT CHEST WO CONT    Narrative    EXAM: CT CHEST WO CONT    CLINICAL INDICATION/HISTORY : F/U of pulm infiltrates and bronchiectasis - look  for resolution. COMPARISON: None    TECHNIQUE: Helical scans through the chest was obtained from the thoracic inlet  to the diaphragm without IV contrast administration. All CT scans at this facility are performed using dose optimization technique as  appropriate to a performed exam to include automated exposure control,  adjustment of the mA and/or kV according to patient size (including appropriate  matching for sight specific examinations)  or use of iterative reconstruction  technique. FINDINGS:     Lungs: Scattered calcified granulomas. Very subtle ground glass opacities in the  right upper lobe -coronal 42., Axial 25 and 26 and 32    Thyroid and chest wall: Unremarkable    Heart and great vessels: Unremarkable    Lymph nodes: No adenopathy    Pleura: No effusions    Upper Abdomen: 2 subcentimeter hypodensities in the left hepatic lobe are too  small to fully characterize but likely represent cysts. Small cyst upper pole  left kidney. Surgical clips upper left abdomen    Bones: Unremarkable      Impression IMPRESSION:    1. Very subtle small vague groundglass opacities in the right upper lobe likely  infectious or inflammatory. Possibly residua from prior pneumonia (comparison  studies not available)  2. Old granulomatous disease. PFTs: None on file    TTE:  I have reviewed the patient's TTE results  No results found for this or any previous visit. CPAP data download reviewed in clinic today:  Date range 8/9/2019 through 10/7/2019 (60 days).   This was from patient's modem and not from his chip. Percentage of days with device usage 88.3%  Percent of days with usage greater than 4 hours 86.7%  Average use for days used 7 hours and 19 minutes  Average AHI 1.6  Setting CPAP at 8 cm H2O      Assessment and Plan:  60 y.o. male with:    Impression:  1. Moderate JUAN, controlled on CPAP therapy: Patient reports full compliance. Reports shows 86.7% compliance greater than 4 hours. Patient reports good control of symptoms  2. Recurrent aspiration in the setting of his Zenker diverticulum: Patient reports symptoms for the last 2 years, abnormal esophagus seen on CT chest from 12/10/18. Patient referred to GI who ordered a barium swallow confirming diagnosis of Zenker's diverticulum. Patient then referred to ENT, confirming aspiration on laryngoscopy and patient referred to thoracic surgery. Patient underwent surgical correction in April. Patient reports very mild residual symptoms. Plan:  -Congratulated patient on CPAP compliance. Continue CPAP at current settings. Counseled patient to replace circuit including mask, tubing, filters every 3 to 6 months. Also counseled patient to clean and dry machine daily to avoid mold buildup.  -Counseled patient against sleepy driving.  -Counseled patient regarding proper sleep hygiene.  -Counseled patient regarding aspiration lifestyle precautions, including elevating head of bed with a wedge pillow and avoiding eating or drinking 3 hours before sleep.  -Management of Zenker's diverticulum per ENT and thoracic surgery. Counseled patient that he may consider follow-up with GI with regards to mild ongoing symptoms to rule out peristalsis defect, with possible manometry.  -Okay to discontinue airway clearance at this time. Counseled patient regarding symptoms of pneumonia, and to contact our clinic if he develops the symptoms again  -Advised to remain active.     Follow-up and Dispositions    · Return in about 1 year (around 10/9/2020).          Orders Placed This Encounter    cpap machine kit       Jaxon Horton MD/MPH     Pulmonary, Critical Care Medicine  [facility]

## 2019-10-09 NOTE — PROGRESS NOTES
The pt. Is non complaining. Tolerating the CPap well. Kuldip Farmer presents today for   Chief Complaint   Patient presents with    Breathing Problem     F/U to 6/5 PFT 7/1, Sleep Study 7/25       Is someone accompanying this pt? No    Is the patient using any DME equipment during OV? CPap   -DME Company [employer] Depression Screening:  3 most recent PHQ Screens 2/6/2019   Little interest or pleasure in doing things More than half the days   Feeling down, depressed, irritable, or hopeless More than half the days   Total Score PHQ 2 4       Learning Assessment:  Learning Assessment 6/5/2019   PRIMARY LEARNER Patient   HIGHEST LEVEL OF EDUCATION - PRIMARY LEARNER  4 YEARS OF COLLEGE   BARRIERS PRIMARY LEARNER NONE   PRIMARY LANGUAGE ENGLISH    NEED No   LEARNER PREFERENCE PRIMARY DEMONSTRATION   ANSWERED BY patient   RELATIONSHIP SELF       Abuse Screening:  The pt. denies    Fall Risk  The pt.is ambulatory and has had no falls. Coordination of Care:  1. Have you been to the ER, urgent care clinic since your last visit? Hospitalized since your last visit? No    2. Have you seen or consulted any other health care providers outside of the [address] [place] [address] since your last visit? Dr. Elba Britt, Dr. Oconnell Draft    Medication variance in dosage/sig per patient as follows: Per Med. Rec.

## 2022-10-11 PROBLEM — K22.5 ACQUIRED DIVERTICULUM OF ESOPHAGUS: Status: ACTIVE | Noted: 2019-04-05

## 2022-10-11 RX ORDER — NAPROXEN 500 MG/1
500 TABLET ORAL
COMMUNITY
Start: 2019-09-30

## 2022-10-11 RX ORDER — SILDENAFIL 100 MG/1
TABLET, FILM COATED ORAL
COMMUNITY
Start: 2019-08-27 | End: 2022-10-21

## 2022-10-11 RX ORDER — OXYCODONE AND ACETAMINOPHEN 10; 325 MG/1; MG/1
1 TABLET ORAL
COMMUNITY
Start: 2019-05-29 | End: 2022-10-21

## 2022-10-11 RX ORDER — TRIAMCINOLONE ACETONIDE 1 MG/G
CREAM TOPICAL
COMMUNITY
Start: 2019-08-30 | End: 2022-10-21

## 2022-10-11 RX ORDER — DULOXETIN HYDROCHLORIDE 60 MG/1
60 CAPSULE, DELAYED RELEASE ORAL DAILY
COMMUNITY
Start: 2017-12-09

## 2022-10-11 RX ORDER — ZOLPIDEM TARTRATE 10 MG/1
10 TABLET ORAL
COMMUNITY
Start: 2019-06-17

## 2022-10-21 ENCOUNTER — OFFICE VISIT (OUTPATIENT)
Dept: PULMONOLOGY | Age: 62
End: 2022-10-21
Payer: COMMERCIAL

## 2022-10-21 VITALS
RESPIRATION RATE: 16 BRPM | HEART RATE: 62 BPM | BODY MASS INDEX: 24.98 KG/M2 | TEMPERATURE: 98.8 F | OXYGEN SATURATION: 100 % | HEIGHT: 72 IN | WEIGHT: 184.4 LBS | DIASTOLIC BLOOD PRESSURE: 72 MMHG | SYSTOLIC BLOOD PRESSURE: 116 MMHG

## 2022-10-21 DIAGNOSIS — G47.33 OSA ON CPAP: Primary | ICD-10-CM

## 2022-10-21 DIAGNOSIS — Z99.89 OSA ON CPAP: Primary | ICD-10-CM

## 2022-10-21 PROCEDURE — 99203 OFFICE O/P NEW LOW 30 MIN: CPT | Performed by: INTERNAL MEDICINE

## 2022-10-21 RX ORDER — MIRTAZAPINE 30 MG/1
30 TABLET, FILM COATED ORAL
COMMUNITY
Start: 2022-08-09

## 2022-10-21 RX ORDER — ESZOPICLONE 1 MG/1
TABLET, FILM COATED ORAL
COMMUNITY
Start: 2022-10-05 | End: 2022-10-21

## 2022-10-21 NOTE — PROGRESS NOTES
[address] [place] [address] [facility]  [phone]      [facility]  Pulmonary, Critical Care, and Sleep Medicine    Pulmonary Office Re-Initial Visit  Name: Estuardo Landaverde 60 y.o. male  MRN: [id number]  : 1960  Service Date: 10/21/22  Chief Complaint:   Chief Complaint   Patient presents with    New Patient     Self referral; last seen by Dr. Danisha Cherry on 10/9/2019    Sleep Apnea     On CPAP (DME: [facility])    Other     Regurgitation of food       History of Present Illness:  Estuadro Landaverde is a 60 y.o. male, who presents to Pulmonary clinic for JUAN. Patient was last seen in clinic on 10/9/2019. Pt remains on CPAP for JUAN. Using nasal interface  Using nightly  Got a new machine about a month ago due to international [employer] recall. Patient reports CPAP therapy is working well. Sleeps well -- only having nocturia every few nights, otherwell doing well  Feels more rested. Caffiene: 2x/day  No issues with Zenker's since surgery (in )  No use of PRN albuterol over the last 2 years  Denies angina, orthopnea, LE swelling, parasomnias, hypnagogic/hypnopompic hallucinations.   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   Lifelong non-smoker      Past Medical History:   Diagnosis Date    Depression     Hyperlipidemia     Hypogonadism in male     Ulcerative colitis ([facility] [address]      Past Surgical History:   Procedure Laterality Date    HX TOTAL COLECTOMY  2012     Family History   Problem Relation Age of Onset    Heart Disease Mother     COPD Father     Heart Disease Father      Social History     Socioeconomic History    Marital status:      Spouse name: Not on file    Number of children: Not on file    Years of education: Not on file    Highest education level: Not on file   Occupational History    Not on file   Tobacco Use    Smoking status: Never    Smokeless tobacco: Never   Substance and Sexual Activity    Alcohol use: No    Drug use: No    Sexual activity: Not on file   Other Topics Concern    Not on file   Social History Narrative    Not on file     Social Determinants of Health     Financial Resource Strain: Not on file   Food Insecurity: Not on file   Transportation Needs: Not on file   Physical Activity: Not on file   Stress: Not on file   Social Connections: Not on file   Intimate Partner Violence: Not on file   Housing Stability: Not on file     No Known Allergies    Prior to Admission medications    Medication Sig Start Date End Date Taking? Authorizing Provider   DULoxetine (CYMBALTA) 60 mg capsule 60 mg. 12/9/17  Yes Provider, Historical   naproxen (NAPROSYN) 500 mg tablet TAKE ONE TABLET BY MOUTH 2 TIMES A DAY AS NEEDED FOR CHEST WALL PAIN 9/30/19  Yes Provider, Historical   oxyCODONE-acetaminophen (PERCOCET 10)  mg per tablet Take 1 Tablet by mouth. 5/29/19  Yes Provider, Historical   sildenafil citrate (VIAGRA) 100 mg tablet TAKE ONE TABLET BY MOUTH ONCE DAILY AS NEEDED 8/27/19  Yes Provider, Historical   triamcinolone acetonide (KENALOG) 0.1 % topical cream Apply  to affected area. 8/30/19  Yes Provider, Historical   zolpidem (AMBIEN) 10 mg tablet TAKE ONE TABLET BY MOUTH NIGHTLY AS NEEDED FOR SLEEP 6/17/19  Yes Provider, Historical   eszopiclone (LUNESTA) 1 mg tablet TAKE 1 TO 2 TABLETS BY MOUTH AT BEDTIME 10/5/22   Provider, Historical   mirtazapine (REMERON) 30 mg tablet Take 30 mg by mouth nightly. 8/9/22   Provider, Historical   cpap machine kit by Does Not Apply route nightly. M.E.D. Provider, Historical   simvastatin (ZOCOR) 20 mg tablet Take 20 mg by mouth nightly. Provider, Historical   diphenoxylate-atropine (LOMOTIL) 2.5-0.025 mg per tablet Take  by mouth four (4) times daily as needed for Diarrhea. Provider, Historical   buPROPion (WELLBUTRIN) 100 mg tablet Take 100 mg by mouth two (2) times a day.     Provider, Historical   divalproex DR (DEPAKOTE) 500 mg tablet Take 500 mg by mouth two (2) times a day. 11/1/18   Provider, Historical   testosterone (ANDROGEL) 20.25 mg/1.25 gram (1.62 %) gel APPLY 3 PUMPS DAILY TO SHOULDERS 12/5/18   Provider, Historical   guaiFENesin ER (MUCINEX) 600 mg ER tablet Take 1 Tab by mouth two (2) times a day. 2/6/19   Cullen Lima MD   sodium chloride 3 % nebulizer solution 4 mL by Nebulization route four (4) times daily. Use with albuterol nebulizer 2/6/19   Cullen Lima MD   albuterol (PROVENTIL VENTOLIN) 2.5 mg /3 mL (0.083 %) nebulizer solution 3 mL by Nebulization route four (4) times daily. Please use with hypertonic saline 3% nebulizer 2/6/19   Cullen Lima MD       Immunizations:  I have reviewed the patient's immunizations  Immunization History   Administered Date(s) Administered    COVID-19, PFIZER PURPLE top, DILUTE for use, (age 15 y+), IM, 30mcg/0.3mL 03/26/2021, 04/19/2021, 11/21/2021, 04/01/2022, 09/15/2022    Influenza High Dose Vaccine PF 09/15/2022    Influenza Vaccine 10/09/2009, 10/24/2013, 11/17/2014, 10/01/2015, 12/06/2016, 10/09/2017, 10/10/2018, 10/25/2018, 11/11/2019, 10/17/2020    Influenza, FLUARIX, FLULAVAL, FLUZONE (age 10 mo+) AND AFLURIA, (age 1 y+), PF, 0.5mL 10/25/2018    Tdap 01/24/2017       Review of Systems:  A complete review of systems was performed as stated in the HPI, all others are negative.     Objective:    Physical Exam:  /72 (BP 1 Location: Left upper arm, BP Patient Position: Sitting, BP Cuff Size: Large adult)   Pulse 62   Temp 98.8 °F (37.1 °C) (Temporal)   Resp 16   Ht 6' (1.829 m)   Wt 83.6 kg (184 lb 6.4 oz)   SpO2 100%   BMI 25.01 kg/m²   Vitals were personally reviewed  Gen: no acute distress, pleasant and cooperative, sitting up in chair, able to climb to exam table w/o difficulty  HEENT: normocephalic/atraumatic, PERRLA, EOMI, no scleral icterus, no oral lesions, good dentition, Mallampati I  Neck: supple, trachea midline, no JVD  CVS: regular rate rhythm, S1/S2, no murmurs/rubs/gallops  Lungs: good air entry B/L, CTABL, no wheezes/rales/rhonchi  Neuro: grossly normal, AAOx3, normal strength and coordination grossly, no focal deficits  Psych: normal memory, thought content, and processing    Labs: I have reviewed the patient's available labs --  No new labs. Imaging:  I have personally reviewed patient's imaging as follows --no new studies in the interval   CT Results (most recent):  Results from Hospital Encounter encounter on 04/12/19    CT CHEST WO CONT    Narrative  EXAM: CT CHEST WO CONT    CLINICAL INDICATION/HISTORY : F/U of pulm infiltrates and bronchiectasis - look  for resolution. COMPARISON: None    TECHNIQUE: Helical scans through the chest was obtained from the thoracic inlet  to the diaphragm without IV contrast administration. All CT scans at this facility are performed using dose optimization technique as  appropriate to a performed exam to include automated exposure control,  adjustment of the mA and/or kV according to patient size (including appropriate  matching for sight specific examinations)  or use of iterative reconstruction  technique. FINDINGS:    Lungs: Scattered calcified granulomas. Very subtle ground glass opacities in the  right upper lobe -coronal 42., Axial 25 and 26 and 32    Thyroid and chest wall: Unremarkable    Heart and great vessels: Unremarkable    Lymph nodes: No adenopathy    Pleura: No effusions    Upper Abdomen: 2 subcentimeter hypodensities in the left hepatic lobe are too  small to fully characterize but likely represent cysts. Small cyst upper pole  left kidney. Surgical clips upper left abdomen    Bones: Unremarkable    Impression  IMPRESSION:    1. Very subtle small vague groundglass opacities in the right upper lobe likely  infectious or inflammatory. Possibly residua from prior pneumonia (comparison  studies not available)  2. Old granulomatous disease.       PFTs: I have reviewed PFTs from 7/1/2019 personally as follows: Spirometry, lung labs, diffusion capacity are all normal without BD response. TTE:  I have reviewed the patient's TTE results  No results found for this or any previous visit. CPAP data download reviewed in clinic today:  Date range: 5/16/2022 through 8/13/2022  Percentage of days with device usage: 100%  Percent of days with usage greater than 4 hours: 100%  Average use for days used 7 hours 15 minutes 29 seconds  Average AHI 1.1  Setting CPAP at 8 cm H2O  Average percentage night in large leak: 2.1%, average large leak 9 minutes 2 seconds      Assessment and Plan:  60 y.o. male with:    Impression:  1. Moderate JUAN, controlled on CPAP therapy: Split-night polysomnography from 7/25/2019, showed an AHI of 24, RDI of 32. Patient started CPAP with nasal interface, 8 cm H2O.  90-day compliance report shows 100% compliance greater than 4 hours. Patient reports good control of symptoms  2. Recurrent aspiration in the setting of his Zenker diverticulum: Patient reports symptoms for previous 2 years, abnormal esophagus seen on CT chest from 12/10/18. Patient referred to GI who ordered a barium swallow confirming diagnosis of Zenker's diverticulum. Patient then referred to ENT, confirming aspiration on laryngoscopy and patient referred to thoracic surgery. Patient underwent surgical correction in April. Patient reports complete resolution of symptoms      Plan:  -Congratulated patient on CPAP compliance. Continue CPAP at current settings. Counseled patient to replace circuit including mask, tubing, filters every 3 to 6 months. Also counseled patient to clean and dry machine daily to avoid mold buildup.   New order for supplies placed today  -Advised patient to contact his DME company[employer] to link his new CPAP so that download may be performed (new CPAP provided due to previous machine recall during [employer] recall)  -Counseled patient against sleepy driving.  -Counseled patient regarding proper sleep hygiene.  -Counseled patient regarding aspiration lifestyle precautions  -Advised to remain active. Follow-up and Dispositions    Return in about 1 year (around 10/21/2023).        Orders Placed This Encounter    AMB SUPPLY ORDER       Shaji Coreas MD/MPH     Pulmonary, Critical Care Medicine  [facility]

## 2022-10-21 NOTE — PROGRESS NOTES
Mary Berrios presents today for   Chief Complaint   Patient presents with    New Patient     Self referral; last seen by Dr. Chong Fuller on 10/9/2019    Sleep Apnea     On CPAP (DME: [email or web address])    Other     Regurgitation of food       Is someone accompanying this pt? No    Is the patient using any DME equipment during OV? Yes. CPAP machine, nebulizer    -DME [employer] Depression Screening:  3 most recent PHQ Screens 10/21/2022   Little interest or pleasure in doing things Not at all   Feeling down, depressed, irritable, or hopeless Not at all   Total Score PHQ 2 0       Learning Assessment:  Learning Assessment 6/5/2019   PRIMARY LEARNER Patient   HIGHEST LEVEL OF EDUCATION - PRIMARY LEARNER  4 YEARS OF [address] [facility] PRIMARY LEARNER NONE   PRIMARY LANGUAGE ENGLISH    NEED No   LEARNER PREFERENCE PRIMARY DEMONSTRATION   ANSWERED BY patient   RELATIONSHIP SELF       Abuse Screening:  Abuse Screening Questionnaire 10/21/2022   Do you ever feel afraid of your partner? N   Are you in a relationship with someone who physically or mentally threatens you? N   Is it safe for you to go home? Y       Fall Risk  Fall Risk Assessment, last 12 mths 10/21/2022   Able to walk? Yes   Fall in past 12 months? 0   Do you feel unsteady? 0   Are you worried about falling 0         Coordination of Care:  1. Have you been to the ER, urgent care clinic since your last visit? Hospitalized since your last visit? No    2. Have you seen or consulted any other health care providers outside of the [address] [place] [address] since your last visit? Include any pap smears or colon screening. Yes.  Dr. Torri Brown, PCP, Dr. Shobha Cochran, GI, Dr. Rivas Gibson, ENT

## 2022-10-21 NOTE — LETTER
10/21/2022    Patient: Everton Conley   YOB: 1960   Date of Visit: 10/21/2022     Idalmis Klein MD  [address] [place] [address]  Via Fax: [phone]    Dear Idalmis Klein MD,      Thank you for referring Mr. Vee Doherty to [address] [place] [address] for evaluation. My notes for this consultation are attached. If you have questions, please do not hesitate to call me. I look forward to following your patient along with you.       Sincerely,    Belen Kumari MD

## 2022-10-24 ENCOUNTER — TELEPHONE (OUTPATIENT)
Dept: PULMONOLOGY | Age: 62
End: 2022-10-24

## 2022-10-24 NOTE — TELEPHONE ENCOUNTER
Called the work number and no one answered. Dr. Bailey Delgado nurse faxed the cpap supply order today once the note from Friday was complete and went with the order.  Erica also faxed a second copy of the order today

## 2022-10-24 NOTE — TELEPHONE ENCOUNTER
Pt was seen in office on 10/21/22 and Dr. Morgan Irving wrote an order for cpap. Pt stated that he spoke to someone at [facility] today and they had not received an order yet. Pt provided phone #- [phone]  Fax- [phone]  I printed the order out again and faxed it myself. Pt is requesting an update from our office.  Please advise   [phone], it is his work number and will be there until 4:30

## 2024-01-18 ENCOUNTER — TELEPHONE (OUTPATIENT)
Age: 64
End: 2024-01-18

## 2024-01-18 NOTE — TELEPHONE ENCOUNTER
Left message for patient to call for appt. Last visit 10/21/2022.  Order needs documentation within the year of being followed.

## 2024-01-18 NOTE — TELEPHONE ENCOUNTER
Patient is stating that [employer] is requesting a new order for CPap supplies.  Please contact pt for any additional information @ [phone]

## 2024-01-24 ENCOUNTER — OFFICE VISIT (OUTPATIENT)
Age: 64
End: 2024-01-24
Payer: COMMERCIAL

## 2024-01-24 VITALS
OXYGEN SATURATION: 98 % | BODY MASS INDEX: 27.25 KG/M2 | HEART RATE: 54 BPM | WEIGHT: 201.2 LBS | HEIGHT: 72 IN | RESPIRATION RATE: 18 BRPM | DIASTOLIC BLOOD PRESSURE: 87 MMHG | SYSTOLIC BLOOD PRESSURE: 121 MMHG | TEMPERATURE: 98.8 F

## 2024-01-24 DIAGNOSIS — G47.33 OSA ON CPAP: Primary | ICD-10-CM

## 2024-01-24 PROCEDURE — 99213 OFFICE O/P EST LOW 20 MIN: CPT | Performed by: INTERNAL MEDICINE

## 2024-01-24 RX ORDER — ESZOPICLONE 3 MG/1
3 TABLET, FILM COATED ORAL NIGHTLY
COMMUNITY

## 2024-01-24 ASSESSMENT — PATIENT HEALTH QUESTIONNAIRE - PHQ9
SUM OF ALL RESPONSES TO PHQ QUESTIONS 1-9: 0
SUM OF ALL RESPONSES TO PHQ9 QUESTIONS 1 & 2: 0
SUM OF ALL RESPONSES TO PHQ QUESTIONS 1-9: 0
2. FEELING DOWN, DEPRESSED OR HOPELESS: 0
1. LITTLE INTEREST OR PLEASURE IN DOING THINGS: 0
SUM OF ALL RESPONSES TO PHQ QUESTIONS 1-9: 0
SUM OF ALL RESPONSES TO PHQ QUESTIONS 1-9: 0

## 2024-01-24 ASSESSMENT — SLEEP AND FATIGUE QUESTIONNAIRES
HOW LIKELY ARE YOU TO NOD OFF OR FALL ASLEEP WHILE SITTING AND TALKING TO SOMEONE: 0
HOW LIKELY ARE YOU TO NOD OFF OR FALL ASLEEP WHILE SITTING AND READING: 0
HOW LIKELY ARE YOU TO NOD OFF OR FALL ASLEEP WHILE WATCHING TV: 0
ESS TOTAL SCORE: 0
HOW LIKELY ARE YOU TO NOD OFF OR FALL ASLEEP WHILE SITTING QUIETLY AFTER LUNCH WITHOUT ALCOHOL: 0
HOW LIKELY ARE YOU TO NOD OFF OR FALL ASLEEP IN A CAR, WHILE STOPPED FOR A FEW MINUTES IN TRAFFIC: 0
HOW LIKELY ARE YOU TO NOD OFF OR FALL ASLEEP WHEN YOU ARE A PASSENGER IN A CAR FOR AN HOUR WITHOUT A BREAK: 0
HOW LIKELY ARE YOU TO NOD OFF OR FALL ASLEEP WHILE SITTING INACTIVE IN A PUBLIC PLACE: 0
HOW LIKELY ARE YOU TO NOD OFF OR FALL ASLEEP WHILE LYING DOWN TO REST IN THE AFTERNOON WHEN CIRCUMSTANCES PERMIT: 0

## 2024-01-24 NOTE — PROGRESS NOTES
Adis Zapata presents today for   Chief Complaint   Patient presents with    Sleep Apnea     Follow up from 10/21/2022    CPAP/BiPAP     Dependence; DME: [facility].    Other     Needs supplies for CPAP       Is someone accompanying this pt? No    Is the patient using any DME equipment during OV? Yes. CPAP machine    -DME Company [employer]    Depression Screenin/24/2024    10:14 AM   PHQ-9 Questionaire   Little interest or pleasure in doing things 0   Feeling down, depressed, or hopeless 0   PHQ-9 Total Score 0       Learning Needs Questionnaire:     Who is the primary learner? Patient    What is the preferred language for health care of the primary learner? ENGLISH    How does the primary learner prefer to learn new concepts? DEMONSTRATION    Answered By Patient    Relationship to Learner SELF          Fall Risk:          No data to display                 Abuse Screenin/24/2024    10:00 AM   AMB Abuse Screening   Do you ever feel afraid of your partner? N   Are you in a relationship with someone who physically or mentally threatens you? N   Is it safe for you to go home? Y         Coordination of Care:    1. Have you been to the ER, urgent care clinic since your last visit? Hospitalized since your last visit? No    2. Have you seen or consulted any other health care providers outside of the [facility] since your last visit? Include any pap smears or colon screening. Yes. Dr. Narciso Nguyen, PCP, Dr. Nikunj Ralph/Dr. Javier Ken, ENT, Dr. Rodney Claros, GI    Medication list has been update per patient.

## 2024-01-24 NOTE — PROGRESS NOTES
[address] [facility]   [address]   [place]  [address]   [phone]           [facility]   Pulmonary, Critical Care, and Sleep Medicine      Pulmonary Office F/U  Name: Adis Zapata 63 y.o. male   MRN: [id number]  : 1960  Service Date: 24   Chief Complaint:   Chief Complaint   Patient presents with    Sleep Apnea     Follow up from 10/21/2022    CPAP/BiPAP     Dependence; DME: [facility]    Other     Needs supplies for CPAP          History of Present Illness:  Adis Zapata is a 63 y.o. male, who presents to Pulmonary clinic for CONNER.  Patient was last seen in clinic on 10/21/22.  Pt remains on CPAP for CONNER.  Using nasal interface  Using nightly  Reports feeling refreshed, no new issues    Retires  (works a  for [employer])    No issues with Zenker's since surgery (in 2019)      Past Medical History:   Diagnosis Date    Depression     Hyperlipidemia     Hypogonadism in male     Ulcerative colitis (HCC)      Past Surgical History:   Procedure Laterality Date    TOTAL COLECTOMY  2012     Family History   Problem Relation Age of Onset    Heart Disease Mother     COPD Father     Heart Disease Father      Social History     Tobacco Use    Smoking status: Never    Smokeless tobacco: Never   Vaping Use    Vaping Use: Never used   Substance Use Topics    Alcohol use: No    Drug use: No     Prior to Admission medications    Medication Sig Start Date End Date Taking? Authorizing Provider   eszopiclone 3 MG TABS Take 1 tablet by mouth nightly. at bedtime.   Yes Provider, MD Rudolph   buPROPion (WELLBUTRIN) 100 MG tablet Take 1 tablet by mouth daily   Yes Automatic Reconciliation, Ar   diphenoxylate-atropine (LOMOTIL) 2.5-0.025 MG per tablet Take 1 tablet by mouth 4 times daily as needed for Diarrhea.   Yes Automatic Reconciliation, Ar   DULoxetine (CYMBALTA) 60 MG extended release capsule Take 1 capsule by mouth daily 17  Yes Automatic

## 2024-10-03 ENCOUNTER — TELEPHONE (OUTPATIENT)
Age: 64
End: 2024-10-03

## 2024-10-03 NOTE — TELEPHONE ENCOUNTER
I have reviewed discharge instructions with the patient and spouse. The patient and spouse verbalized understanding. [facility] to follow. Prescription for oxycodone given. Pt called and stated that Medicare needs data to approve medical payments for supplies and the szymanski CPAP machine does not have an SD card/memory chip due to the recall. He wants to know how he can get a new chip for his CPAP. Also, medicare needs Dr. Bermeo to send the office notes/data.

## 2024-10-04 ENCOUNTER — TELEPHONE (OUTPATIENT)
Age: 64
End: 2024-10-04

## 2024-10-09 NOTE — TELEPHONE ENCOUNTER
Lmtcb to let me know what company he gets his supplies from so we can send them the office notes they need for billing

## 2024-10-15 PROBLEM — Z01.01 ENCOUNTER FOR EXAMINATION OF EYES AND VISION WITH ABNORMAL FINDINGS: Status: ACTIVE | Noted: 2018-07-25

## 2024-10-15 PROBLEM — H52.203 UNSPECIFIED ASTIGMATISM, BILATERAL: Status: ACTIVE | Noted: 2018-07-25

## 2024-10-15 PROBLEM — H52.4 PRESBYOPIA: Status: ACTIVE | Noted: 2018-07-25

## 2024-10-15 PROBLEM — H52.02 HYPERMETROPIA, LEFT EYE: Chronic | Status: ACTIVE | Noted: 2018-07-25

## 2024-10-15 PROBLEM — H52.11 MYOPIA, RIGHT EYE: Status: ACTIVE | Noted: 2018-07-25

## 2024-10-15 PROBLEM — G47.33 OSA ON CPAP: Status: ACTIVE | Noted: 2023-08-03

## 2024-10-15 RX ORDER — OXYCODONE AND ACETAMINOPHEN 10; 325 MG/1; MG/1
1 TABLET ORAL EVERY 12 HOURS PRN
COMMUNITY
Start: 2024-10-02

## 2024-10-15 RX ORDER — SILDENAFIL 100 MG/1
100 TABLET, FILM COATED ORAL PRN
COMMUNITY
Start: 2024-09-17

## 2024-10-16 ENCOUNTER — OFFICE VISIT (OUTPATIENT)
Age: 64
End: 2024-10-16
Payer: MEDICARE

## 2024-10-16 VITALS — BODY MASS INDEX: 27.29 KG/M2 | HEIGHT: 72 IN

## 2024-10-16 DIAGNOSIS — G47.33 OSA ON CPAP: Primary | ICD-10-CM

## 2024-10-16 PROCEDURE — 99213 OFFICE O/P EST LOW 20 MIN: CPT | Performed by: INTERNAL MEDICINE

## 2024-10-16 PROCEDURE — G8484 FLU IMMUNIZE NO ADMIN: HCPCS | Performed by: INTERNAL MEDICINE

## 2024-10-16 PROCEDURE — 1123F ACP DISCUSS/DSCN MKR DOCD: CPT | Performed by: INTERNAL MEDICINE

## 2024-10-16 PROCEDURE — G8419 CALC BMI OUT NRM PARAM NOF/U: HCPCS | Performed by: INTERNAL MEDICINE

## 2024-10-16 PROCEDURE — G8428 CUR MEDS NOT DOCUMENT: HCPCS | Performed by: INTERNAL MEDICINE

## 2024-10-16 PROCEDURE — 4004F PT TOBACCO SCREEN RCVD TLK: CPT | Performed by: INTERNAL MEDICINE

## 2024-10-16 PROCEDURE — 3017F COLORECTAL CA SCREEN DOC REV: CPT | Performed by: INTERNAL MEDICINE

## 2024-10-16 ASSESSMENT — SLEEP AND FATIGUE QUESTIONNAIRES
HOW LIKELY ARE YOU TO NOD OFF OR FALL ASLEEP WHILE SITTING QUIETLY AFTER LUNCH WITHOUT ALCOHOL: WOULD NEVER DOZE
HOW LIKELY ARE YOU TO NOD OFF OR FALL ASLEEP WHILE LYING DOWN TO REST IN THE AFTERNOON WHEN CIRCUMSTANCES PERMIT: SLIGHT CHANCE OF DOZING
HOW LIKELY ARE YOU TO NOD OFF OR FALL ASLEEP WHILE SITTING AND TALKING TO SOMEONE: WOULD NEVER DOZE
HOW LIKELY ARE YOU TO NOD OFF OR FALL ASLEEP WHILE SITTING INACTIVE IN A PUBLIC PLACE: WOULD NEVER DOZE
HOW LIKELY ARE YOU TO NOD OFF OR FALL ASLEEP IN A CAR, WHILE STOPPED FOR A FEW MINUTES IN TRAFFIC: WOULD NEVER DOZE
HOW LIKELY ARE YOU TO NOD OFF OR FALL ASLEEP WHILE SITTING AND READING: WOULD NEVER DOZE
HOW LIKELY ARE YOU TO NOD OFF OR FALL ASLEEP WHILE WATCHING TV: SLIGHT CHANCE OF DOZING
ESS TOTAL SCORE: 2
HOW LIKELY ARE YOU TO NOD OFF OR FALL ASLEEP WHEN YOU ARE A PASSENGER IN A CAR FOR AN HOUR WITHOUT A BREAK: WOULD NEVER DOZE

## 2024-10-16 NOTE — PROGRESS NOTES
Adis Zapata presents today for   Chief Complaint   Patient presents with    Sleep Apnea     Follow up form 2024    CPAP/BiPAP     DME: [facility].    Other     Dependence on other enabling machines and devices       Is someone accompanying this pt? No    Is the patient using any DME equipment during OV? Yes. Nebulizer, CPAP machine     -DME Company [facility]    Depression Screenin/24/2024    10:14 AM   PHQ-9 Questionaire   Little interest or pleasure in doing things 0   Feeling down, depressed, or hopeless 0   PHQ-9 Total Score 0       Learning Needs Questionnaire:     Who is the primary learner? Patient    What is the preferred language for health care of the primary learner? ENGLISH    How does the primary learner prefer to learn new concepts? DEMONSTRATION    Answered By Patient    Relationship to Learner SELF          Fall Risk:         10/16/2024    12:18 PM   Fall Risk   Do you feel unsteady or are you worried about falling?  no   2 or more falls in past year? no   Fall with injury in past year? no        Abuse Screenin/24/2024    10:00 AM   AMB Abuse Screening   Do you ever feel afraid of your partner? N   Are you in a relationship with someone who physically or mentally threatens you? N   Is it safe for you to go home? Y         Coordination of Care:    1. Have you been to the ER, urgent care clinic since your last visit? Hospitalized since your last visit? No    2. Have you seen or consulted any other health care providers outside of the [facility] since your last visit? Include any pap smears or colon screening. Yes. Dr. Narciso Nguyen, PCP, Dr. Javier Ken/Dr. Nikunj soria, ENT, Dr. Rodney Claros, GI    Medication list has been update per patient.

## 2024-10-16 NOTE — PROGRESS NOTES
[address] [facility]   [address]   [place]  [address]   [phone]           [facility]   Pulmonary, Critical Care, and Sleep Medicine      Pulmonary Office F/U  Name: Adis Zapata 64 y.o. male   MRN: [id number]  : 1960  Service Date: 10/16/24   Chief Complaint:   Chief Complaint   Patient presents with    Sleep Apnea     Follow up form 2024    CPAP/BiPAP     DME: [facility]    Other     Dependence on other enabling machines and devices        History of Present Illness:  Adis Zapata is a 64 y.o. male, who presents to Pulmonary clinic for CONNER.  Patient was last seen in clinic on 24  Pt doing well in the interval  Pt remains on CPAP with great compliance  Using nasal interface  Using nightly  Reports feeling refreshed, no new issues    Pt now retired, former  for [employer]    No issues with Zenker's since surgery (in 2019)      Past Medical History:   Diagnosis Date    Depression     Hyperlipidemia     Hypogonadism in male     Ulcerative colitis (HCC)      Past Surgical History:   Procedure Laterality Date    TOTAL COLECTOMY  2012     Family History   Problem Relation Age of Onset    Heart Disease Mother     COPD Father     Heart Disease Father      Social History     Tobacco Use    Smoking status: Never    Smokeless tobacco: Never   Vaping Use    Vaping status: Never Used   Substance Use Topics    Alcohol use: No    Drug use: No     Prior to Admission medications    Medication Sig Start Date End Date Taking? Authorizing Provider   oxyCODONE-acetaminophen (PERCOCET)  MG per tablet Take 1 tablet by mouth every 12 hours as needed for Pain. 10/2/24  Yes ProviderRudolph MD   sildenafil (VIAGRA) 100 MG tablet Take 1 tablet by mouth as needed for Erectile Dysfunction 1 hour prior to sexual activity. 24  Yes Rudolph Galvez MD   eszopiclone 3 MG TABS Take 1 tablet by mouth nightly. at bedtime.   Yes ProviderRudolph

## 2024-10-18 ENCOUNTER — TELEPHONE (OUTPATIENT)
Age: 64
End: 2024-10-18

## 2024-10-18 NOTE — TELEPHONE ENCOUNTER
Pt called regarding CPAP supplies order. [employer] said they have not received the order. The fax number is [phone]

## 2024-10-21 ENCOUNTER — TELEPHONE (OUTPATIENT)
Age: 64
End: 2024-10-21

## 2024-10-21 NOTE — TELEPHONE ENCOUNTER
Called patient's mobile/home number and left voicemail message re; his CPAP supplies. Informed him that it has been sent to [employer] and ahs been received and being process. Advised to call office if he has any questions.

## 2024-10-25 ENCOUNTER — TELEPHONE (OUTPATIENT)
Age: 64
End: 2024-10-25

## 2024-10-29 ENCOUNTER — TELEPHONE (OUTPATIENT)
Age: 64
End: 2024-10-29

## 2024-10-29 NOTE — TELEPHONE ENCOUNTER
Patient wanted to check to see if the form had been received that the medical supply company needed signed for his cpap supplies.  Advised I will have to check and call him back.  Form located and confirmed it was faxed yesterday.  Call patient back and left him a detailed message advising

## 2024-11-05 ENCOUNTER — TELEPHONE (OUTPATIENT)
Age: 64
End: 2024-11-05

## 2024-11-05 NOTE — TELEPHONE ENCOUNTER
Spoke with Mr. Zapata about his CPAP use in relation to his upcoming appointment on 11-8-2024.   Mr. Zapata reported that he uses a CPAP. Informed the patient to bring his SD card for compliance data.

## 2024-11-08 ENCOUNTER — OFFICE VISIT (OUTPATIENT)
Age: 64
End: 2024-11-08

## 2024-11-08 VITALS
BODY MASS INDEX: 27.63 KG/M2 | HEART RATE: 85 BPM | DIASTOLIC BLOOD PRESSURE: 62 MMHG | SYSTOLIC BLOOD PRESSURE: 130 MMHG | OXYGEN SATURATION: 97 % | WEIGHT: 204 LBS | HEIGHT: 72 IN | RESPIRATION RATE: 16 BRPM | TEMPERATURE: 98.5 F

## 2024-11-08 DIAGNOSIS — G47.33 OSA (OBSTRUCTIVE SLEEP APNEA): Primary | ICD-10-CM

## 2024-11-08 ASSESSMENT — PATIENT HEALTH QUESTIONNAIRE - PHQ9
SUM OF ALL RESPONSES TO PHQ QUESTIONS 1-9: 0
2. FEELING DOWN, DEPRESSED OR HOPELESS: NOT AT ALL
SUM OF ALL RESPONSES TO PHQ QUESTIONS 1-9: 0
1. LITTLE INTEREST OR PLEASURE IN DOING THINGS: NOT AT ALL
SUM OF ALL RESPONSES TO PHQ9 QUESTIONS 1 & 2: 0
SUM OF ALL RESPONSES TO PHQ QUESTIONS 1-9: 0
SUM OF ALL RESPONSES TO PHQ QUESTIONS 1-9: 0

## 2024-11-08 ASSESSMENT — SLEEP AND FATIGUE QUESTIONNAIRES
HOW LIKELY ARE YOU TO NOD OFF OR FALL ASLEEP WHILE SITTING INACTIVE IN A PUBLIC PLACE: WOULD NEVER DOZE
HOW LIKELY ARE YOU TO NOD OFF OR FALL ASLEEP WHILE SITTING QUIETLY AFTER LUNCH WITHOUT ALCOHOL: WOULD NEVER DOZE
HOW LIKELY ARE YOU TO NOD OFF OR FALL ASLEEP WHILE LYING DOWN TO REST IN THE AFTERNOON WHEN CIRCUMSTANCES PERMIT: SLIGHT CHANCE OF DOZING
HOW LIKELY ARE YOU TO NOD OFF OR FALL ASLEEP WHEN YOU ARE A PASSENGER IN A CAR FOR AN HOUR WITHOUT A BREAK: WOULD NEVER DOZE
HOW LIKELY ARE YOU TO NOD OFF OR FALL ASLEEP WHILE SITTING AND TALKING TO SOMEONE: WOULD NEVER DOZE
HOW LIKELY ARE YOU TO NOD OFF OR FALL ASLEEP IN A CAR, WHILE STOPPED FOR A FEW MINUTES IN TRAFFIC: WOULD NEVER DOZE
HOW LIKELY ARE YOU TO NOD OFF OR FALL ASLEEP WHILE WATCHING TV: SLIGHT CHANCE OF DOZING
HOW LIKELY ARE YOU TO NOD OFF OR FALL ASLEEP WHILE SITTING AND READING: WOULD NEVER DOZE
ESS TOTAL SCORE: 2

## 2024-11-08 NOTE — PROGRESS NOTES
[facility] since your last visit? Include any pap smears or colon screening. no    Medication list has been updated according to patient.  
lung  > Additional: None    COMPARISON: CT chest from 12/10/2018, 2 view chest x-ray from 03/09/2017    TECHNIQUE: PA and lateral views of the chest    _______________    FINDINGS:    HEART AND MEDIASTINUM: No appreciable cardiomegaly. Remaining mediastinal contours within normal limits.    LUNGS AND PLEURAL SPACES: Improved aeration in the right middle lobe and lateral left lower lobe when compared to the prior CT scanogram of the chest. Persistent patchy right infrahilar/central right middle lobe region opacity No discrete developing alveolar opacity. No pneumothorax or effusion    BONY THORAX AND SOFT TISSUES: Unremarkable.    _______________    Impression  1. Improved chest x-ray to prior CT scanogram with some degree of suspected residual perihilar right middle lobe airspace disease.    Signed By: Andrew Aguilera MD on 12/14/2018 3:04 PM        CT (Most Recent) CT ABDOMEN PELVIS WO IV CONTRAST 08/02/2023    Narrative  EXAM: CT of the abdomen and pelvis without 8/2/2023.    INDICATION: History of ulcerative colitis with colectomy and J-pouch anastomosis. Diarrhea and generalized muscle cramping.    COMPARISON: CT scan of the abdomen and pelvis from 7/11/2014    TECHNIQUE: Axial CT imaging of the abdomen and pelvis was performed without contrast. Multiplanar reformats were generated.    Dose reduction techniques:  Automated exposure control, mAs and/or kVp reductions based on patient size, and iterative reconstruction.  The specific techniques utilized on this CT exam have been documented in the patient's electronic medical record.    Digital imaging and communications and medicine (DICOM) format image data are available to nonaffiliated external healthcare facilities or entities on a secure, media free, reciprocally searchable basis with patient authorization for at least a 12 month period after this study.  _______________    FINDINGS:    LIVER, BILIARY: Grossly unremarkable. No biliary

## 2024-11-11 ENCOUNTER — CLINICAL DOCUMENTATION (OUTPATIENT)
Age: 64
End: 2024-11-11